# Patient Record
Sex: MALE | Race: WHITE | HISPANIC OR LATINO | Employment: FULL TIME | URBAN - METROPOLITAN AREA
[De-identification: names, ages, dates, MRNs, and addresses within clinical notes are randomized per-mention and may not be internally consistent; named-entity substitution may affect disease eponyms.]

---

## 2023-01-24 ENCOUNTER — TELEPHONE (OUTPATIENT)
Dept: HEALTH INFORMATION MANAGEMENT | Facility: OTHER | Age: 58
End: 2023-01-24

## 2023-01-30 ENCOUNTER — TELEPHONE (OUTPATIENT)
Dept: CARDIOLOGY | Facility: MEDICAL CENTER | Age: 58
End: 2023-01-30

## 2023-01-30 NOTE — TELEPHONE ENCOUNTER
Called patient to request records for NP appointment with JOSE. Called to confirm if this will be first time patient is seeing a cardiologist and if the patient has had any recent cardiac imaging, testing, or lab work done outside of [facility]. No answer, left voicemail to call back.

## 2023-01-30 NOTE — TELEPHONE ENCOUNTER
Caller: Neel Mccurdy    Topic/issue: Patient is retuning MA's call. Patient states he had an EKG and X-ray done at the [facility] in [place] on 01-.    Callback Number: [phone]    Thank you,  -Keisha JONES